# Patient Record
Sex: MALE | Race: WHITE
[De-identification: names, ages, dates, MRNs, and addresses within clinical notes are randomized per-mention and may not be internally consistent; named-entity substitution may affect disease eponyms.]

---

## 2018-05-04 ENCOUNTER — HOSPITAL ENCOUNTER (INPATIENT)
Dept: HOSPITAL 68 - ERH | Age: 83
LOS: 4 days | Discharge: NURSING, REHAB OR LONG-TERM CARE | DRG: 93 | End: 2018-05-08
Attending: INTERNAL MEDICINE | Admitting: INTERNAL MEDICINE
Payer: COMMERCIAL

## 2018-05-04 VITALS — HEIGHT: 69 IN | WEIGHT: 117.44 LBS | BODY MASS INDEX: 17.39 KG/M2

## 2018-05-04 VITALS — SYSTOLIC BLOOD PRESSURE: 96 MMHG | DIASTOLIC BLOOD PRESSURE: 58 MMHG

## 2018-05-04 DIAGNOSIS — M47.896: ICD-10-CM

## 2018-05-04 DIAGNOSIS — Z91.81: ICD-10-CM
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
## 2018-05-04 DIAGNOSIS — Q68.8: ICD-10-CM
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
## 2018-05-04 DIAGNOSIS — D64.9: ICD-10-CM

## 2018-05-04 DIAGNOSIS — G20: ICD-10-CM

## 2018-05-04 DIAGNOSIS — F02.80: ICD-10-CM

## 2018-05-04 DIAGNOSIS — K21.9: ICD-10-CM

## 2018-05-04 DIAGNOSIS — R26.81: Primary | ICD-10-CM

## 2018-05-04 DIAGNOSIS — N40.0: ICD-10-CM

## 2018-05-04 LAB
ABSOLUTE GRANULOCYTE CT: 5.4 /CUMM (ref 1.4–6.5)
APTT BLD: 32 SEC (ref 25–37)
BASOPHILS # BLD: 0 /CUMM (ref 0–0.2)
BASOPHILS NFR BLD: 0.6 % (ref 0–2)
EOSINOPHIL # BLD: 0.1 /CUMM (ref 0–0.7)
EOSINOPHIL NFR BLD: 1.8 % (ref 0–5)
ERYTHROCYTE [DISTWIDTH] IN BLOOD BY AUTOMATED COUNT: 14.6 % (ref 11.5–14.5)
GRANULOCYTES NFR BLD: 66.9 % (ref 42.2–75.2)
HCT VFR BLD CALC: 31.9 % (ref 42–52)
LYMPHOCYTES # BLD: 1.7 /CUMM (ref 1.2–3.4)
MCH RBC QN AUTO: 29.3 PG (ref 27–31)
MCHC RBC AUTO-ENTMCNC: 31.8 G/DL (ref 33–37)
MCV RBC AUTO: 92.1 FL (ref 80–94)
MONOCYTES # BLD: 0.8 /CUMM (ref 0.1–0.6)
PLATELET # BLD: 155 /CUMM (ref 130–400)
PMV BLD AUTO: 9.2 FL (ref 7.4–10.4)
PROTHROMBIN TIME: 12 SEC (ref 9.4–12.5)
RED BLOOD CELL CT: 3.47 /CUMM (ref 4.7–6.1)
WBC # BLD AUTO: 8 /CUMM (ref 4.8–10.8)

## 2018-05-04 PROCEDURE — G0480 DRUG TEST DEF 1-7 CLASSES: HCPCS

## 2018-05-04 NOTE — CT SCAN REPORT
EXAMINATION:
CT LOWER EXTREMITY WITHOUT CONTRAST, left hip
 
CLINICAL INFORMATION:
Unwitnessed fall. Cannot walk. Left hip pain.
 
COMPARISON:
None
 
TECHNIQUE:
Axial images obtained through the left hip. Coronal and sagittal reformatted
images are performed at the CT scanner
 
DLP:
661.90 mGy-cm
 
FINDINGS:
There is no fracture or dislocation of the left hip. No focal bone lesion. No
joint effusion. No soft tissue abnormality around the left hip.
 
Within the pelvis there is a large volume of stool in the visualized large
bowel loops. There are numerous diverticula of left colon and sigmoid without
inflammatory changes.
 
IMPRESSION:
No acute abnormality left hip.

## 2018-05-04 NOTE — CT SCAN REPORT
EXAMINATION:
CT LUMBAR SPINE WITHOUT CONTRAST
 
CLINICAL INFORMATION:
Unwitnessed fall. Unable to ambulate. Evaluate for fracture.
 
COMPARISON:
No relevant prior imaging.
 
TECHNIQUE:
 images were obtained. CT acquisition of the lumbar spine was performed
without intravenous administration contrast. Data was reformatted into
multiplanar images at the acquisition workstation.
 
DLP:
425.65 mGy-cm
 
FINDINGS:
There is spinal scoliosis with a subtle rightward convex curvature. Slight
grade 1 anterolisthesis of L4 on L5 that appears to be related to advanced
facet degenerative changes at this level. Alignment is otherwise normal.
Vertebral heights are preserved. No acute fracture. There is slight loss of
intervertebral disc height with associated disc osteophyte spurring at the
levels of L2-L3, L3-L4, and L4-L5.
 
At T12-L1 and L1-L2 the annular contours are normal. No canal or
neuroforaminal compromise at these 2 levels.
 
At L2-L3 there is a diffusely bulging disc. Bilateral facet degenerative
change. No canal stenosis. No foraminal nerve root compression.
 
At L3-L4 there is a diffusely bulging disc. Bilateral facet degenerative
change. No canal stenosis. Mild to moderate mass effect on the foraminal
segment of left L3 nerve root.
 
At L4-L5 there is a diffuse annular bulge/pseudobulge causing ventral
flattening of the thecal sac. Advanced bilateral facet degenerative change.
No canal stenosis. Mild mass effect on the foraminal segments of both L4
nerve roots.
 
At L5-S1 there is a slightly bulging disc. Bilateral facet degenerative
change. No canal stenosis. Mild mass effect on the foraminal segments of both
L5 nerve roots.
 
Limited visualization of the retroperitoneal structures reveals numerous
diverticula within the sigmoid colon. There is atherosclerotic caliber
indication involve the abdominal aorta and iliac vessels. Psoas and
paraspinal muscle groups are symmetric.
 
IMPRESSION:
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
anterolisthesis of L4 on L5 that appears to be related to advanced facet
degenerative changes at this level. No acute fracture. No evidence of canal
compromise.

## 2018-05-04 NOTE — CT SCAN REPORT
EXAMINATION:
CT HEAD WITHOUT CONTRAST
CT CERVICAL SPINE WITHOUT CONTRAST
 
CLINICAL INFORMATION:
Status post unwitnessed fall. Rule out ICH and fracture.
 
COMPARISON:
None
 
TECHNIQUE:
CT of the head and cervical spine were performed without intravenous
contrast. Multiplanar reformats were rendered and reviewed.
 
DLP:
959 mGy-cm.
 
FINDINGS:
CT head:
There is no intracranial hemorrhage, extra-axial collection, or calvarial
fracture. There is no mass, mass effect, or CT evidence of large territory
infarction. There is mild scattered hypoattenuation in the bilateral cerebral
white matter, which is nonspecific but likely reflects small vessel disease.
There is moderate diffuse brain parenchymal volume loss with prominence of
the ventricles and sulci.
 
The visualized paranasal sinuses are essentially clear. There are layering
secretions within the nasopharynx. The visualized mastoid air cells are
clear.
 
CT cervical spine:
The cervical vertebral body heights are maintained. The craniovertebral
junction is intact. There is trace retrolisthesis of C5 on C6.
 
There are multilevel degenerative changes with intervertebral disc height
loss at C4-C5 and C5-C6 in addition to multilevel endplate osteophytes,
uncovertebral hypertrophy, and facet arthropathy. There is no high-grade
osseous encroachment on the spinal canal. There is multilevel neural
foraminal stenosis which is most advanced at C4-C5 and C5-C6.
 
The lung apices are clear. No mass or fluid collection is seen within the
soft tissues. 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 There are atheromatous
changes at the carotid bifurcations more extensive on the left.
 
IMPRESSION:
CT head:
No acute intracranial abnormality. Mild small vessel ischemic changes and
moderate diffuse brain parenchymal volume loss.
 
CT cervical spine:
No cervical spine fracture or traumatic malalignment. Multilevel degenerative
spondylotic changes without high-grade osseous encroachment on the spinal
canal. Incidentally noted right-sided vocal fold paresis.

## 2018-05-04 NOTE — HISTORY & PHYSICAL
Mohsen NAVARRO,[place] 05/04/18 8479:
General Information and HPI
MD Statement:
I have seen and personally examined VOLODYMYR ZHANG and documented this H&P.
 
The patient is a 83 year old M who presented with a patient stated chief 
complaint of [Unwitnessed fall].
 
Source of Information: ER notes
Exam Limitations: unable to give history, dementia
History of Present Illness:
83-year-old male past medical history of Parkinson's and dementia who presents 
with an unwitnessed fall.  He was found by his aide on the floor and it is 
unclear if fall was mechanical or otherwise, if he had lost consciousness or how
long he will lay on the floor before he was found.  It is not known if patient 
has fallen in the recent past so if he has a history of multiple falls.
 
Patient is unable to contribute to the history due to dementia, and his wife who
is his primary caregiver cannot be reached on the phone as at the time of this 
interview to get more history.
 
Allergies/Medications
Allergies:
Coded Allergies:
No Known Allergies (05/04/18)
 
Home Med list
Divalproex Sodium 250 MG TABLET.DR   1 TAB PO QPM UNKNOWN  (Reported)
Memantine HCl (Namenda XR) 28 MG CAP.SPR.24   1 CAP PO DAILY DEMENTIA  (Reported
)
Omeprazole 40 MG CAPSULE.DR   1 CAP PO DAILY GI  (Reported)
Tamsulosin HCl 0.4 MG CAP.ER.24H   1 CAP PO DAILY PROSTATE  (Reported)
 
Compliance With Home Meds: UNKNOWN
 
Past History
 
Travel History
Traveled to [place] past 21 day No
 
Medical History
Neurological: dementia, Parkinson's disease
 
Surgical History
Surgical History: non-contributory
 
Review of Systems
 
Review of Systems
Constitutional:
Reports: no symptoms. 
 
Exam & Diagnostic Data
Last 24 Hrs of Vital Signs/I&O
 Vital Signs
 
 
Date Time Temp Pulse Resp B/P B/P Pulse O2 O2 Flow FiO2
 
     Mean Ox Delivery Rate 
 
05/04 2354 98.4 57 18 96/58  94 Room Air  
 
05/04 2307 98.0 54 16 100/56  98 Room Air  
 
05/04 2133 97.7 56 16 102/53  99 Room Air  
 
05/04 1915 97.5 91 18 100/54  98 Room Air  
 
05/04 1617 96.7 84 18 101/68  99 Room Air  
 
05/04 1408  70 18 125/69  98 Room Air  
 
05/04 1142 97.8 70 18 139/92  97 Room Air  
 
 
 Intake & Output
 
 
 05/05 0800 05/05 0000 05/04 1600
 
Intake Total   0
 
Output Total   300
 
Balance   -300
 
    
 
Intake, Oral   0
 
Output, Urine   300
 
Patient   160 lb
 
Weight   
 
Weight   Reported by Patient
 
Measurement   
 
Method   
 
 
 
 
Physical Exam
General Appearance Alert, No Acute Distress, oriented to person only, tremors, 
Oriented only to self, became agitated during exam
Skin No Significant Lesion
HEENT dry mucous membranes
Cardiovascular Regular Rate, Normal S1, Normal S2
Lungs Clear to Auscultation (anteriorly), Normal Air Movement
Abdomen Soft, No Tenderness
Extremities No Edema, Normal Pulses, No Tenderness/Swelling
Last 24 Hrs of Labs/Galo:
 Laboratory Tests
 
05/04/18 1405:
Urine Opiates Screen < 100, Methadone Screen < 40, Barbiturate Screen < 60, Ur 
Phencyclidine Scrn < 6.00, Amphetamines Screen < 100, U Benzodiazepines Scrn < 
85, Urine Cocaine Screen < 50, Urine Cannabis Screen < 5.00, Urine Color STRAW, 
Urine Clarity CLEAR, Urine pH 7.5, Ur Specific Gravity 1.015, Urine Protein NEG,
Urine Ketones NEG, Urine Nitrite NEG, Urine Bilirubin NEG, Urine Urobilinogen 
0.2, Ur Leukocyte Esterase NEG, Ur Microscopic EXAM NOT REQUIRED, Urine 
Hemoglobin NEG, Urine Glucose NEG
 
05/04/18 1300:
Anion Gap 6, Estimated GFR > 60, BUN/Creatinine Ratio 24.4, Glucose 86, Calcium 
9.0, Total Bilirubin 0.5, AST 21, ALT 18  L, Alkaline Phosphatase 42, Creatine 
Kinase 79, Troponin I < 0.01, Total Protein 5.9  L, Albumin 3.4  L, Globulin 2.5
, Albumin/Globulin Ratio 1.4, PT 12.0, INR 1.10, APTT 32, CBC w Diff NO MAN DIFF
REQ, RBC 3.47  L, MCV 92.1, MCH 29.3, MCHC 31.8  L, RDW 14.6  H, MPV 9.2, Gran %
66.9, Lymphocytes % 21.0, Monocytes % 9.7  H, Eosinophils % 1.8, Basophils % 0.6
, Absolute Granulocytes 5.4, Absolute Lymphocytes 1.7, Absolute Monocytes 0.8  H
, Absolute Eosinophils 0.1, Absolute Basophils 0, Serum Alcohol < 10.0
 
 
Assessment/Plan
Assessment:
83-year-old male past medical history of Parkinson's and dementia who presents 
with an unwitnessed fall.  Imaging does not show any acute fractures.
 
Assessment
1.  Unwitnessed fall-not known if mechanical or orthostatic given patient's 
history of Parkinson's disease or due to dehydration
2.  Gait instability/deconditioning
3.  Mild Anemia
 
 
Plan
-Admit to general medicine
-Check Orthostats
-IV fluid normal saline for hydration
-Check a Depakote level
-Physical therapy evaluation
-Repeat labs in a.m.
-Patient will need short-term rehab
-Tylenol/Ibuprofen as needed for pain
-Give low dose IM haldol if very agitated. avoid benzo's
-Avoid delirium triggers like noise, dehydration, constipation, pain etc
-Keep hydrated, adequate pain control, frequent re-orientation
-Avoid opiates due to increased delirium risk
-Bowel regimen 
-Keep FC for now
-SC lovenox
-Resume his impt home medications including memantine, depakote, 
 
*I called but was unable to get the wife tonight on the phone. Please call wife 
in a.m. to obtain more history about his gait and mobility status, CODE STATUS, 
diet type/swallowing issues, past medical history and confirm medication list-pt
is not on any meds for Parkinson's on his reconcile med list so please clarify 
if he does have Parkinson's dx or Lewy Body dementia.
 
As Ranked By This Provider
Problem List:
 1. Gait instability
 
 2. Fall
   Qualifiers
 Encounter type: initial encounter Qualified Code: W19.XXXA - Unspecified fall, 
initial encounter
 
 
Core Measures/Misc (9/17)
 
Acute Coronary Syndrome
ACS Diagnosis: No
 
Congestive Heart Failure
Congestive Heart Failure Diagnosis No
 
Cerebrovascular Accident
CVA/TIA Diagnosis: No
 
VTE (View Protocol)
VTE Risk Factors Age>40
No Mechanical VTE Prophylaxis d/t N/A MechProphylax Ordered
No VTE Pharm Prophylaxis d/t NA PharmProphylax ordered
 
Sepsis (View protocol)
Sepsis Present: No
 
Resident Review Statement
Resident Statement: examined this patient
Other Findings:
see HPI
 
 
Georgia Don 05/05/18 0159:
Attending MD Review Statement
 
Attending Statement
Attending MD Statement: examined this patient, discuss w/resident/PA/NP, agreed 
w/resident/PA/NP, reviewed EMR data (avail), reviewed images, amended to note
Attending Assessment/Plan:
 
CC: Fall
PMH: Dementia
 
History is limited, patient has significant dementia appears angry and mildly 
agitated, does not talk much except "no". Family not available bedside. We Tried
calling patient's wife, she did not answer the phone. According to ER records 
patient was found on the floor in between pouch and recliner by his health aide.
As per EMS patient's wife is primary caregivers and she was not at home. Patient
's fall was unwitnessed. 
 
Vitals: Temperature 97.8, pulse 70, RR 18, blood pressure 139/92, saturating 97%
on room air.
On exam: Thin built, appears angry, does not answer question. Tremors present, 
rigidity present, does not follow instructions : neuro exam difficult to assess.
Mucosa moist. NO skin lesions, no pedal edema. Abdo; Soft, NT, NT. CVS : s1, s2,
RRR. RS: clear bases but limited exam. 
 
CT cervical spine, CT head, chest x-ray, hip x-ray, pelvis x-ray, left hip CT 
scan, lumbar CT scan : Reviewed. No acute changes
 
Assessment and plan 
 
83-year-old male with history of dementia was brought in ER to EMS after found 
on the floor by health aide. Patient may have unwitnessed fall, no history is 
available regarding loss of consciousness or seizure-like episode or any other 
trauma. Wife could not be contacted to get detailed information. History and 
physical examination is limited because of his significant dementia and 
agitation. Multiple images were obtain in ER, which were unremarkable for any 
acute trauma. Patient was unable to ambulate even with assistance, may need 
rehabilitation placement. Need to get more detailed information from patient's 
wife. Questionable history of parkinsonism
 
+ Unwitnessed fall
+ Physical deconditioning and unsteady gait
+ History of dementia
 
- Admit to general medicine
- Continue gentle hydration
- try orthostatic vitals in a.m. : Currently patient is agitated
- PT evaluation
- Check valproate level
- DVT prophylaxis
- Assess for rehabilitation placement

## 2018-05-04 NOTE — RADIOLOGY REPORT
EXAMINATION:\H\
\N\XR CHEST
 
CLINICAL INFORMATION:
Fall.
 
COMPARISON:
None
 
TECHNIQUE:
Frontal view of the chest was obtained.
 
FINDINGS:
 
 
The lungs are well expanded. There is no focal consolidation, edema, or
effusion. No pneumothorax. The cardiomediastinal silhouette is within normal
limits of size with a calcified aorta. No acute osseous abnormality.
 
IMPRESSION:
No acute pulmonary findings. No displaced fractures are seen.

## 2018-05-04 NOTE — RADIOLOGY REPORT
EXAMINATION:
XR PELVIS
XR HIP, LEFT
 
CLINICAL INFORMATION:
Unwitnessed fall.
 
COMPARISON:
None
 
TECHNIQUE:
Frontal view of the pelvis with 2 additional views of the left hip.
 
FINDINGS:
There is no fracture or dislocation. The femoral heads are well-seated within
their acetabula. Mild degenerative changes are seen at the hips with small
marginal osteophytes present. The pelvic rim is intact. The sacroiliac joints
and pubic symphysis are intact. The bowel gas pattern is unremarkable.
Degenerative changes of the lower lumbar spine.
 
IMPRESSION:
No acute fracture or malalignment. Mild degenerative changes of the hips.

## 2018-05-04 NOTE — ED MVC/FALL/TRAUMA COMPLAINT
History of Present Illness
 
General
Chief Complaint: Fall
Stated Complaint: FALL
Source: patient, EMS, health aide
Exam Limitations: dementia, poor historian
Allergies
Coded Allergies:
No Known Allergies (18)
 
Reconcile Medications
Divalproex Sodium 250 MG TABLET.DR   1 TAB PO QPM UNKNOWN  (Reported)
Memantine HCl (Namenda XR) 28 MG CAP.SPR.24   1 CAP PO DAILY DEMENTIA  (Reported
)
Omeprazole 40 MG CAPSULE.DR   1 CAP PO DAILY GI  (Reported)
Tamsulosin HCl 0.4 MG CAP.ER.24H   1 CAP PO DAILY PROSTATE  (Reported)
 
Triage Note:
BIBA FROM HOME, WAS FOUND BY AID ON FLOOR IN
BETWEEN COUCH AND RECLINER. (UNWITNESSED).  PER
EMS, PT'S WIFE IS PRIMARY CARETAKER, BUT HAD LEFT
HOUSE.  MOANING WITH SPASTIC ARM AND LEG
MEOVEMNTS.  PMH: PARKINSONS.  UNABLE TO ANSWER
QUESTIONS.  TOWEL TAPED AROUND NECK FOR SPINAL
IMMOBILIZATION.
Triage Nurses Notes Reviewed? yes
Onset: Gradual
Duration: hour(s):
Timing: single episode today
Severity: moderate
Injuries/Fall Location: head, neck
Method of Injury: fall
Loss of Consciousness: unsure
HPI:
82yo male with hx of Parkinson's BIBA to ED after unwitnessed fall at home 
today.  Per patient's aide, when she arrived she found the patient on the floor 
between a side table and his recliner chair.  Patient had an unwitnessed fall, 
unknown loss of consciousness and duration.  She is wife was at work when he 
fell.  HPI is limited due to patient's Parkinson's/dementia however he reports 
no pain at this time.  Patient is unsure of why he fell, does not remember the 
fall. 
(Darcy FRANCIS,Elizabeth Ni)
 
Vital Signs & Intake/Output
Vital Signs & Intake/Output
 Vital Signs
 
 
Date Time Temp Pulse Resp B/P B/P Pulse O2 O2 Flow FiO2
 
     Mean Ox Delivery Rate 
 
 2133 97.7 56 16 102/53  99 Room Air  
 
 1915 97.5 91 18 100/54  98 Room Air  
 
 1617 96.7 84 18 101/68  99 Room Air  
 
 1408  70 18 125/69  98 Room Air  
 
 1142 97.8 70 18 139/92  97 Room Air  
 
 
 
(Kaila NAVARRO,Zackery CABA)
 
Past History
 
Travel History
Traveled to [place] past 21 day No
 
Medical History
Any Pertinent Medical History? see below for history
Neurological: Parkinson's disease
 
Surgical History
Surgical History: non-contributory
 
Psychosocial History
What is your primary language English
Tobacco Use: Cognitive Impairment
ETOH Use: 6
 
Family History
Hx Contributory? No
(Darcy FRANCIS,Elizabeth Ni)
 
Review of Systems
 
Review of Systems
Constitutional:
Reports: no symptoms. 
Eyes:
Reports: no symptoms. 
Ears, Nose, Throat, Mouth:
Reports: no symptoms. 
Respiratory:
Reports: no symptoms. 
Cardiovascular:
Reports: no symptoms. 
Gastrointestinal/Abdominal:
Reports: no symptoms. 
Genitourinary:
Reports: no symptoms. 
Musculoskeletal:
Reports: see HPI. 
Skin:
Reports: no symptoms. 
Neurological/Psychological:
Reports: see HPI. 
All Other Systems: Reviewed and Negative
(Darcy FRANCIS,Elizabeth Ni)
 
Physical Exam
 
Physical Exam
General Appearance: well developed/nourished, no apparent distress, alert, awake
Head: atraumatic, normal appearance
Eyes:
Bilateral: normal appearance, PERRL, EOMI. 
Ears, Nose, Throat, Mouth: hearing grossly normal, moist mucous membrane
Neck: normal inspection, posterior cervical spine tenderness
Respiratory: normal breath sounds, no respiratory distress, lungs clear
Cardiovascular: regular rate/rhythm
Gastrointestinal: normal bowel sounds, soft, non-tender, no organomegaly
Extremities: ROM of upper extremities WNL ROM of lower extremities is limited 
with rigidity present, tenderness to left hip
Neurologic/Psych: awake, alert, CNs II-XII nml as tested
Skin: intact, normal color, warm/dry
 
Core Measures
ACS in differential dx? Yes
CVA/TIA Diagnosis No
Sepsis Present: No
Sepsis Focused Exam Completed? No
(Darcy FRANCIS,Elizabeth Ni)
 
Progress
Differential Diagnosis: abd injury, C/T/L spine injury, ext injury, ICH, pelvis 
injury, pnemothorax, spinal cord injury
Diagnostic Imaging:
Viewed by Me: Radiology Read, CT Scan.  Discussed w/RAD: Radiology Read, CT 
Scan. 
Radiology Impression: PATIENT: VOLODYMYR ZHANG  MEDICAL RECORD NO: [id number] 
PRESENT AGE: 83  PATIENT ACCOUNT NO: [id number] : 36  LOCATION: [facility] 
ORDERING PHYSICIAN: Elizabeth FRANCIS     SERVICE DATE:  EXAM 
TYPE: RAD - XRY-CHEST XRAY, SINGLE VIEW EXAMINATION:\H\ \N\XR CHEST CLINICAL 
INFORMATION: Fall. COMPARISON: None TECHNIQUE: Frontal view of the chest was 
obtained. FINDINGS: The lungs are well expanded. There is no focal consolidation
, edema, or effusion. No pneumothorax. The cardiomediastinal silhouette is 
within normal limits of size with a calcified aorta. No acute osseous 
abnormality. IMPRESSION: No acute pulmonary findings. No displaced fractures are
seen. DICTATED BY: Hugh Moreira MD  DATE/TIME DICTATED:18 
:ERNESTO  DATE/TIME TRANSCRIBED:18 CONFIDENTIAL, 
DO NOT COPY WITHOUT APPROPRIATE AUTHORIZATION.  <Electronically signed in Other 
Vendor System>                                                                  
                     SIGNED BY: Hugh Moreira , PATIENT: 
VOLODYMYR ZHANG  MEDICAL RECORD NO: [id number] PRESENT AGE: 83  PATIENT ACCOUNT NO
: [id number] : 36  LOCATION: [facility] ORDERING PHYSICIAN: Elizabeth FRANCIS 
   SERVICE DATE: 1205 EXAM TYPE: RAD - XRY-AP PELVIS; XRY-HIP 2-3 VIEWS
, LEFT EXAMINATION: XR PELVIS XR HIP, LEFT CLINICAL INFORMATION: Unwitnessed 
fall. COMPARISON: None TECHNIQUE: Frontal view of the pelvis with 2 additional 
views of the left hip. FINDINGS: There is no fracture or dislocation. The 
femoral heads are well-seated within their acetabula. Mild degenerative changes 
are seen at the hips with small marginal osteophytes present. The pelvic rim is 
intact. The sacroiliac joints and pubic symphysis are intact. The bowel gas 
pattern is unremarkable. Degenerative changes of the lower lumbar spine. 
IMPRESSION: No acute fracture or malalignment. Mild degenerative changes of the 
hips. DICTATED BY: Hugh Moreira MD  DATE/TIME DICTATED:18 
:ERNESTO  DATE/TIME TRANSCRIBED:18 CONFIDENTIAL, 
DO NOT COPY WITHOUT APPROPRIATE AUTHORIZATION.  <Electronically signed in Other 
Vendor System>                                                                  
                     SIGNED BY: Hugh Moreira MD 18, PATIENT: 
VOLODYMYR ZHANG  MEDICAL RECORD NO: [id number] PRESENT AGE: 83  PATIENT ACCOUNT NO
: [id number] : 36  LOCATION: [facility] ORDERING PHYSICIAN: Elizabeth FRANCIS 
   SERVICE DATE:  EXAM TYPE: CAT - CT CERV SPINE WO IV CONTRAST; CT
HEAD WO IV CONTRAST EXAMINATION: CT HEAD WITHOUT CONTRAST CT CERVICAL SPINE 
WITHOUT CONTRAST CLINICAL INFORMATION: Status post unwitnessed fall. Rule out 
ICH and fracture. COMPARISON: None TECHNIQUE: CT of the head and cervical spine 
were performed without intravenous contrast. Multiplanar reformats were rendered
and reviewed. DLP: 959 mGy-cm. FINDINGS: CT head: There is no intracranial 
hemorrhage, extra-axial collection, or calvarial fracture. There is no mass, 
mass effect, or CT evidence of large territory infarction. There is mild 
scattered hypoattenuation in the bilateral cerebral white matter, which is 
nonspecific but likely reflects small vessel disease. There is moderate diffuse 
brain parenchymal volume loss with prominence of the ventricles and sulci. The 
visualized paranasal sinuses are essentially clear. There are layering 
secretions within the nasopharynx. The visualized mastoid air cells are clear. 
CT cervical spine: The cervical vertebral body heights are maintained. The 
craniovertebral junction is intact. There is trace retrolisthesis of C5 on C6. 
There are multilevel degenerative changes with intervertebral disc height loss 
at C4-C5 and C5-C6 in addition to multilevel endplate osteophytes, uncovertebral
hypertrophy, and facet arthropathy. There is no high-grade osseous encroachment 
on the spinal canal. There is multilevel neural foraminal stenosis which is most
advanced at C4-C5 and C5-C6. The lung apices are clear. No mass or fluid 
collection is seen within the soft tissues. 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 
paresis. There are atheromatous changes at the carotid bifurcations more 
extensive on the left. IMPRESSION: CT head: No acute intracranial abnormality. 
Mild small vessel ischemic changes and moderate diffuse brain parenchymal volume
loss. CT cervical spine: No cervical spine fracture or traumatic malalignment. 
Multilevel degenerative spondylotic changes without high-grade osseous 
encroachment on the spinal canal. Incidentally noted right-sided vocal fold 
paresis. DICTATED BY: Fortunato Mooney MD  DATE/TIME DICTATED:18 
:ERNESTO  DATE/TIME TRANSCRIBED:18 CONFIDENTIAL, 
DO NOT COPY WITHOUT APPROPRIATE AUTHORIZATION.  <Electronically signed in Other 
Vendor System>                                                                  
                     SIGNED BY: Fortunato Mooney MD 18 1529, PATIENT: 
VOLODYMYR ZHANG  MEDICAL RECORD NO: [id number] PRESENT AGE: 83  PATIENT ACCOUNT NO
: [id number] : 36  LOCATION: [facility] ORDERING PHYSICIAN: Elizabeth FRANCIS 
   SERVICE DATE:  EXAM TYPE: CAT - CT LUMB SPINE WO IV CONTRAST 
EXAMINATION: CT LUMBAR SPINE WITHOUT CONTRAST CLINICAL INFORMATION: Unwitnessed 
fall. Unable to ambulate. Evaluate for fracture. COMPARISON: No relevant prior 
imaging. TECHNIQUE:  images were obtained. CT acquisition of the lumbar 
spine was performed without intravenous administration contrast. Data was 
reformatted into multiplanar images at the acquisition workstation. DLP: 425.65 
mGy-cm FINDINGS: There is spinal scoliosis with a subtle rightward convex 
curvature. Slight grade 1 anterolisthesis of L4 on L5 that appears to be related
to advanced facet degenerative changes at this level. Alignment is otherwise 
normal. Vertebral heights are preserved. No acute fracture. There is slight loss
of intervertebral disc height with associated disc osteophyte spurring at the 
levels of L2-L3, L3-L4, and L4-L5. At T12-L1 and L1-L2 the annular contours are 
normal. No canal or neuroforaminal compromise at these 2 levels. At L2-L3 there 
is a diffusely bulging disc. Bilateral facet degenerative change. No canal 
stenosis. No foraminal nerve root compression. At L3-L4 there is a diffusely 
bulging disc. Bilateral facet degenerative change. No canal stenosis. Mild to 
moderate mass effect on the foraminal segment of left L3 nerve root. At L4-L5 
there is a diffuse annular bulge/pseudobulge causing ventral flattening of the 
thecal sac. Advanced bilateral facet degenerative change. No canal stenosis. 
Mild mass effect on the foraminal segments of both L4 nerve roots. At L5-S1 
there is a slightly bulging disc. Bilateral facet degenerative change. No canal 
stenosis. Mild mass effect on the foraminal segments of both L5 nerve roots. 
Limited visualization of the retroperitoneal structures reveals numerous 
diverticula within the sigmoid colon. There is atherosclerotic caliber 
indication involve the abdominal aorta and iliac vessels. Psoas and paraspinal 
muscle groups are symmetric. IMPRESSION: There is multilevel degenerative 
spondylosis of the lumbar spine with grade 1 anterolisthesis of L4 on L5 that 
appears to be related to advanced facet degenerative changes at this level. No 
acute fracture. No evidence of canal compromise. DICTATED BY: Osiel Akhtar MD  DATE/TIME DICTATED:18 :ERNESTO  DATE/TIME 
TRANSCRIBED:18 CONFIDENTIAL, DO NOT COPY WITHOUT APPROPRIATE 
AUTHORIZATION.  <Electronically signed in Other Vendor System>                  
                                                                     SIGNED BY: 
Osiel Akhtar MD 18 [id number]
Initial ED EKG: sinus rhythm @66bpm, first degree AV block
Prior EKG: unchanged (02)
(Darcy FRANCIS,Elizabeth Ni)
Plan of Care:
 Orders
 
 
Procedure Date/time Status
 
Nothing by Mouth  B Active
 
Saline Lock 2111 Active
 
Misc Message 2111 Active
 
ED Holding Orders 2111 Active
 
Admit to inpatient 2111 Active
 
Add-on Test (ER Only) 2111 Active
 
Vital Signs 2111 Active
 
Code Status 2111 Active
 
URINE DRUG SCREEN FOR ER ONLY  1153 Complete
 
URINALYSIS  1153 Complete
 
TROPONIN LEVEL  1153 Complete
 
PARTIAL THROMBOPLASTIN TIME  1153 Complete
 
PROTHROMBIN TIME  1153 Complete
 
ETHANOL  1153 Complete
 
COMPREHENSIVE METABOLIC PANEL  1153 Complete
 
CREATINE PHOSPHOKINASE  1153 Complete
 
CBC WITHOUT DIFFERENTIAL  1153 Complete
 
EKG  1153 Active
 
 
 Laboratory Tests
 
 
 
18 1405:
Urine Opiates Screen < 100, Methadone Screen < 40, Barbiturate Screen < 60, Ur 
Phencyclidine Scrn < 6.00, Amphetamines Screen < 100, U Benzodiazepines Scrn < 
85, Urine Cocaine Screen < 50, Urine Cannabis Screen < 5.00, Urine Color STRAW, 
Urine Clarity CLEAR, Urine pH 7.5, Ur Specific Gravity 1.015, Urine Protein NEG,
Urine Ketones NEG, Urine Nitrite NEG, Urine Bilirubin NEG, Urine Urobilinogen 
0.2, Ur Leukocyte Esterase NEG, Ur Microscopic EXAM NOT REQUIRED, Urine 
Hemoglobin NEG, Urine Glucose NEG
 
18 1300:
Anion Gap 6, Estimated GFR > 60, BUN/Creatinine Ratio 24.4, Glucose 86, Calcium 
9.0, Total Bilirubin 0.5, AST 21, ALT 18  L, Alkaline Phosphatase 42, Creatine 
Kinase 79, Troponin I < 0.01, Total Protein 5.9  L, Albumin 3.4  L, Globulin 2.5
, Albumin/Globulin Ratio 1.4, PT 12.0, INR 1.10, APTT 32, CBC w Diff NO MAN DIFF
REQ, RBC 3.47  L, MCV 92.1, MCH 29.3, MCHC 31.8  L, RDW 14.6  H, MPV 9.2, Gran %
66.9, Lymphocytes % 21.0, Monocytes % 9.7  H, Eosinophils % 1.8, Basophils % 0.6
, Absolute Granulocytes 5.4, Absolute Lymphocytes 1.7, Absolute Monocytes 0.8  H
, Absolute Eosinophils 0.1, Absolute Basophils 0, Serum Alcohol < 10.0
 
X-rays of chest, pelvis, left hip are within normal limits.  Patient's EKG is 
stable, no acute changes.  Labs are within normal limits.  No UTI.  CT head and 
neck is stable, no acute fractures or intracranial abnormality.
 
Has attempted to have patient ambulate here in the emergency department however 
he is complaining of significant pain, requires assistance with 2 people and 
walker, he cannot get in and out of bed by himself.  Patient reports significant
pain to left hip.  Given these findings will obtain CT imaging to further rule 
out hip/pelvis/lumbar spine fracture.  Dr. Bright present to see and evaluate the 
patient.
 
CT scan shows no acute fracture of hip/pelvis/spine.  Patient was medicated with
pain medication.  Ambulation was attempted however patient unable to walk more 
than 5 steps and requires assistance and walker to ambulate, could not get into 
bed required 2 people to help him get into bed.  This is diminished from his 
baseline.  Patient would benefit from short-term rehabilitation, premature 
discharge may result in further falls and injury.  Case management recommend 
full Admission.  Patient's wife agrees with this plan.
 
Spoke with hospitalist Dr. Don regarding this patient's general medicine 
admission.
(Darcy FRANCIS,Elizabeth Ni)
(Kaila NAVARRO,Zackery CABA)
 
Departure
 
Departure
Disposition: STILL A PATIENT
Condition: Stable
Clinical Impression
Primary Impression: Fall
Qualifiers:  Encounter type: initial encounter Qualified Code: W19.XXXA - 
Unspecified fall, initial encounter
Secondary Impressions: Gait instability
Referrals:
Madhuri NAVARRO,Naveen GIVENS
 
Departure Forms:
Customer Survey
General Discharge Information
 
Admission Note
Spoke With:
Georgia Don MD
Documentation of Exam:
Documentation of any treatments & extenuating circumstances including Concerns 
Regarding Discharge (functional status, medication knowledge or non-compliance, 
living conditions, etc.) that warrant an admission rather than observation: [
Unwitnessed fall at home with persistent pain in gait instability requiring 
short-term rehabilitation, case management consult, physical therapy with pain 
management, premature discharge medically unsafe]
 
(Darcy FRANCIS,Elizabeth Ni)
 
PA/NP Co-Sign Statement
Statement:
ED Attending supervision documentation-
 
[X] I saw and evaluated the patient. I have also reviewed all the pertinent lab 
results and diagnostic results. I agree with the findings and the plan of care 
as documented in the PA's/NP's documentation.  Patient presents for evaluation 
of a fall from his recliner.  Patient was found at the side of the recliner on 
the floor.  History and physical examination are limited due to Parkinson 
disease.  Physical examination reveals an alert and interactive patient moves 
all extremities.  It is unclear if the patient has tenderness of the extremities
chest or abdomen given the Parkinson disease and his compromised ability to 
communicate.
 
[] I have reviewed the ED Record and agree with the PA's/NP's documentation.
 
[] Additions or exceptions (if any) to the PAs/NP's note and plan are 
summarized below:
[]
 
(Kaila NAVARRO,Zackery CABA)
 
PA/NP Co-Sign Statement
Statement:
 
ED Attending supervision documentation-
 
[X] I saw and evaluated the patient. I have also reviewed all the pertinent lab 
results and diagnostic results. I agree with the findings and the plan of care 
as documented in the PA's/NP's documentation. 
 
18, 21;12.... PT with parkinson's disease, from home, with no focal 
tenderness on exam, difficulty to ambulate/care for self, merits admission for 
further care. 
 
[] I have reviewed the ED Record and agree with the PA's/NP's documentation.
 
[] Additions or exceptions (if any) to the PAs/NP's note and plan are 
summarized below:
[]
 
(Justino NAVARRO,Trey MILLER)

## 2018-05-05 VITALS — SYSTOLIC BLOOD PRESSURE: 100 MMHG | DIASTOLIC BLOOD PRESSURE: 60 MMHG

## 2018-05-05 VITALS — DIASTOLIC BLOOD PRESSURE: 60 MMHG | SYSTOLIC BLOOD PRESSURE: 112 MMHG

## 2018-05-05 VITALS — DIASTOLIC BLOOD PRESSURE: 60 MMHG | SYSTOLIC BLOOD PRESSURE: 110 MMHG

## 2018-05-05 LAB
ABSOLUTE GRANULOCYTE CT: 3.7 /CUMM (ref 1.4–6.5)
BASOPHILS # BLD: 0 /CUMM (ref 0–0.2)
BASOPHILS NFR BLD: 0.7 % (ref 0–2)
EOSINOPHIL # BLD: 0.1 /CUMM (ref 0–0.7)
EOSINOPHIL NFR BLD: 2 % (ref 0–5)
ERYTHROCYTE [DISTWIDTH] IN BLOOD BY AUTOMATED COUNT: 14.4 % (ref 11.5–14.5)
GRANULOCYTES NFR BLD: 65 % (ref 42.2–75.2)
HCT VFR BLD CALC: 28.4 % (ref 42–52)
LYMPHOCYTES # BLD: 1.3 /CUMM (ref 1.2–3.4)
MCH RBC QN AUTO: 30 PG (ref 27–31)
MCHC RBC AUTO-ENTMCNC: 32.7 G/DL (ref 33–37)
MCV RBC AUTO: 91.5 FL (ref 80–94)
MONOCYTES # BLD: 0.5 /CUMM (ref 0.1–0.6)
PLATELET # BLD: 136 /CUMM (ref 130–400)
PMV BLD AUTO: 10.1 FL (ref 7.4–10.4)
RED BLOOD CELL CT: 3.1 /CUMM (ref 4.7–6.1)
WBC # BLD AUTO: 5.6 /CUMM (ref 4.8–10.8)

## 2018-05-05 NOTE — PN- ATT ADDEND
Attending Addendum
Attending Brief Note
Patient seen and examined.  I am really not able to get any history at all from 
him.  He answers yes and no.  Tells me he can't walk but other than that will 
not answer most of my questions. 
 On exam his pressure is 112/60,  pulse is 63, afebrile and satting at 93%.  He 
is awake and alert, lungs have decreased breath sounds at the bases, heart is S1
-S2 regular, abdomen is soft nontender and he has no edema.  He does appear to 
have some parkinsonian like features with some mild rigidity but my neuro exam 
is limited due to lack of cooperation.
 
He is an 83-year-old male with a history of parkinsonism and dementia as per the
records on valproic acid, memantine and tamsulosin as an outpatient who is here 
status post a fall, weakness and inability to ambulate.  The fall was 
unwitnessed ,  however his EKG doesn't show any acute ST-T changes and and his 
enzymes were negative so the thought was more that this was mechanical in 
nature.  
We are hydrating him gently to account for any orthostatic hypotension 
associated with parkinsonin's, will get a formal PT eval.  Will need more 
collateral from his wife when she gets in.  And will follow closely.  
All of his imaging from the ED has been negative.  He does have degenerative 
lumbar spondylosis but that appears more chronic in nature.

## 2018-05-05 NOTE — ADMISSION CERTIFICATION
Admission Certification
 
Certification Statement
- As attending physician, I certify that at the time of
- admission, based on clinical presentation, severity of
- symptoms, need for further diagnostic testing and
- therapeutic interventions, and risk of adverse outcomes
- without in-hospital treatment, in my clinical assessment,
- this patient requires an acute hospital stay for a minimum
- of two nights or longer. I have also considered psychsocial
- factors such as support system, advanced age, financial
- issues, cognitive issues, and failed out-patient treatments,
- past re-admission history, safety of patient, and lack of
- compliance as applicable.
Specific rationale supporting this admission is:
Unwitnessed fall

## 2018-05-06 VITALS — SYSTOLIC BLOOD PRESSURE: 100 MMHG | DIASTOLIC BLOOD PRESSURE: 60 MMHG

## 2018-05-06 VITALS — DIASTOLIC BLOOD PRESSURE: 70 MMHG | SYSTOLIC BLOOD PRESSURE: 140 MMHG

## 2018-05-06 VITALS — DIASTOLIC BLOOD PRESSURE: 66 MMHG | SYSTOLIC BLOOD PRESSURE: 144 MMHG

## 2018-05-06 LAB
ABSOLUTE GRANULOCYTE CT: 3.7 /CUMM (ref 1.4–6.5)
BASOPHILS # BLD: 0 /CUMM (ref 0–0.2)
BASOPHILS NFR BLD: 0.8 % (ref 0–2)
EOSINOPHIL # BLD: 0.1 /CUMM (ref 0–0.7)
EOSINOPHIL NFR BLD: 1.8 % (ref 0–5)
ERYTHROCYTE [DISTWIDTH] IN BLOOD BY AUTOMATED COUNT: 13.7 % (ref 11.5–14.5)
GRANULOCYTES NFR BLD: 66.9 % (ref 42.2–75.2)
HCT VFR BLD CALC: 31.1 % (ref 42–52)
LYMPHOCYTES # BLD: 1.2 /CUMM (ref 1.2–3.4)
MCH RBC QN AUTO: 30 PG (ref 27–31)
MCHC RBC AUTO-ENTMCNC: 33.1 G/DL (ref 33–37)
MCV RBC AUTO: 90.9 FL (ref 80–94)
MONOCYTES # BLD: 0.5 /CUMM (ref 0.1–0.6)
PLATELET # BLD: 145 /CUMM (ref 130–400)
PMV BLD AUTO: 9.9 FL (ref 7.4–10.4)
RED BLOOD CELL CT: 3.42 /CUMM (ref 4.7–6.1)
WBC # BLD AUTO: 5.6 /CUMM (ref 4.8–10.8)

## 2018-05-06 NOTE — PN- HOUSESTAFF
[facility]
Subjective
Follow-up For:
Mechanical fall
Altered mental status
Subjective:
No overnight events.  Patient remained afebrile. Seen and  examined this 
morning.  He denied any chest pain, short of breath, nausea, vomiting, chills, 
fever, abdominal pain dysuria.  Patient is alert and oriented this morning
 
Review of Systems
Constitutional:
Denies: chills, fever. 
EENTM:
Reports: no symptoms. 
Cardiovascular:
Denies: chest pain, orthopena, palpitations. 
Respiratory:
Denies: cough, short of breath, sputum production. 
Gastrointestinal:
Denies: abdominal pain, diarrhea, melena, nausea. 
Genitourinary:
Reports: no symptoms. 
Neurological/Psychological:
Reports: no symptoms. 
 
Objective
Last 24 Hrs of Vital Signs/I&O
 Vital Signs
 
 
Date Time Temp Pulse Resp B/P B/P Pulse O2 O2 Flow FiO2
 
     Mean Ox Delivery Rate 
 
 0857  60  142/78     
 
/ 0620 98.1 67 18 144/66  98 Room Air  
 
 2306 97.5 62 20 100/60  97 Room Air  
 
 1427 98.0 60 20 110/60  99   
 
 
 Intake & Output
 
 
 / 1600 05/06 0800 05/06 0000
 
Intake Total  700 400
 
Output Total   
 
Balance  700 400
 
    
 
Intake, IV  600 300
 
Intake, Oral  100 100
 
Number  0 
 
Bowel   
 
Movements   
 
Patient  116 lb 
 
Weight   
 
Weight  Bed scale 
 
Measurement   
 
Method   
 
 
 
 
Physical Exam
General Appearance: Alert, Oriented X3, Cooperative
Skin: No Rashes
Skin Temp/Moisture Exam: Warm/Dry
Sepsis Skin Exam (color): Normal for Ethnicity
HEENT: Atraumatic, PERRLA, EOMI
Neck: Supple
Cardiovascular: Normal S1, Normal S2
Lungs: Clear to Auscultation
Abdomen: Soft, No Tenderness
Neurological: Difficulty in articulation
Extremities: No Edema
 
Assessment/Plan
Assessment:
83-year-old male past medical history of Parkinson's and dementia who presents 
with an unwitnessed fall. 
 
We are following the patient for following problems:
 
Altered mental status and unwitnessed mechanical fall:
-Imaging studies are negative for any fracture.
-According to wife patient has difficulty in doing home physical therapy for 
last couple of days.  Considering patient's history of Parkinson disease wife 
reported having increase pain/stiffness in the legs.
-Wife also reported that patient has difficulty in articulation for long time 
that's his baseline.
-According to his wife possibly patient has mechanical fall and he was alert and
he has baseline dementia.
-Yesterday patient was sleepy possibly he received Roxicodone in ED that's why 
he was drowsy.  He was arousable.
-Patient needs PT evaluation for gait instability
-Possible short-term rehabilitation.
-Patient is following neurology as outpatient for Parkinson disease and his wife
reported that his Parkinson disease is worsening according to his neurologist.  
Patient had recent CAT scan of the head for his Parkinson disease.
-Continue pain medications according to pain [facility].
-Considering patient's altered mental status he is receiving IV fluids.
-We will get the record from his neurologist tomorrow.
 
History of dementia:
-Continue memantine
 
History of GERD:
-Continue omeprazole
 
History of BPH:
-Continue Flomax
 
History of psychiatric illness:
-Continue Depakote
 
DVT prophylaxis:
Mechanical and subcutaneous Lovenox
 
CODE STATUS:
Full code
 
 
Problem List:
 1. Gait instability
 
 2. Fall
 
Pain Ratin
Pain Location:
none
Pain Goal: Remain pain free
Pain Plan:
pain pathway
Tomorrow's Labs & Rationales:
cbc/bep
 
 
Kristyn NAVARRO,Jenna 18 1005:
Attending MD Review Statement
 
Attending Statement
Attending MD Statement: examined this patient, discuss w/resident/PA/NP, agreed 
w/resident/PA/NP, reviewed EMR data (avail), discussed with nursing, reviewed 
images
Attending Assessment/Plan:
The Patient was seen by PT and they are recommending STR.  We need to call 
patient's neurologist first thing tomorrow morning to get more collateral 
history in terms of his parkinsonism, workup to date and medications for the 
same.  Right now we have him on tamsulosin, memantine and valproate as per his 
wife which are his outpatient medications.  There is no evidence of any 
infection or organic etiology accounting for his worsening mental status and 
weakness.

## 2018-05-07 VITALS — DIASTOLIC BLOOD PRESSURE: 68 MMHG | SYSTOLIC BLOOD PRESSURE: 100 MMHG

## 2018-05-07 VITALS — SYSTOLIC BLOOD PRESSURE: 136 MMHG | DIASTOLIC BLOOD PRESSURE: 70 MMHG

## 2018-05-07 NOTE — DISCHARGE SUMMARY
Visit Information
 
Visit Dates
Admission Date:
05/04/18
 
Discharge Date:
05/08/18
 
 
Hospital Course
 
Course
Attending Physician:
Fadumo Degroot MD
 
Primary Care Physician:
Mendez Myers MD
 
Hospital Course:
Patient is a 83-year-old gentleman with a past medical history significant for 
parkinsonism and dementia(on valproic acid, memantine and tamsulosin as an 
outpatient) presented to the ED for the evaluation of progressive weakness /
inability to walk and unwitnessed fall.
 
Pertinent vitals and labs on admission
Temperature afebrile pulse 70 his rate 18 blood pressure stable 139/92 on room 
air
 
Labs: No leukocytosis H&H low but stable normal BEP
Urine toxicology negative urinalysis is benign
 
IMAGING studies including CT cervical spine, CT head, chest x-ray, hip x-ray, 
pelvis x-ray, left hip CT scan, lumbar CT scan didn't reveal any acute pathology
/changes.
 
Following problems were addressed while patient was on [facility] floor
 
1.  Progressive physical deconditioning/inability to walk-with resultant 
unwitnessed fall:
 
Patient was admitted to general floor.  He received IV hydration for any 
orthostatic hypotension associated with parkinsonin's.  Troponins remained 
negative and EKG didn't show any STT changes.  Patient was evaluated by 
physical therapy recommended short-term rehabilitation on discharge.
 
2.  History of dementia
Valproic acid and  memantine was continued during the hospitalization.  His wife
reported that he never been diagnosed with Parkinson's.  Patient has depression 
and couple of years back he was given antidepressant medication that caused him 
extrapyramidal signs and having tremors in the hands that's what patient is 
using valproic acid.  Wife also reported the patient is following neurologist as
outpatient and he had recent CT scan of head.  Because the record from neurology
they never mention anything about Parkinson disease.  Patient was instructed to 
follow his neurologist as outpatient after the discharge.
 
DVT prophylaxis with subcutaneous Lovenox
Adequate pain control with Tylenol
Bowel regimen for constipation including senna as an MiraLAX
Patient is full code
Allergies:
Coded Allergies:
No Known Allergies (05/04/18)
 
Significant Procedures:
EXAM TYPE: CAT - CT CERV SPINE WO IV CONTRAST; CT HEAD WO IV CONTRAST
 
EXAMINATION:
CT HEAD WITHOUT CONTRAST
CT CERVICAL SPINE WITHOUT CONTRAST
 
CLINICAL INFORMATION:
Status post unwitnessed fall. Rule out ICH and fracture.
 
COMPARISON:
None
 
TECHNIQUE:
CT of the head and cervical spine were performed without intravenous
contrast. Multiplanar reformats were rendered and reviewed.
 
DLP:
959 mGy-cm.
 
FINDINGS:
CT head:
There is no intracranial hemorrhage, extra-axial collection, or calvarial
fracture. There is no mass, mass effect, or CT evidence of large territory
infarction. There is mild scattered hypoattenuation in the bilateral cerebral
white matter, which is nonspecific but likely reflects small vessel disease.
There is moderate diffuse brain parenchymal volume loss with prominence of
the ventricles and sulci.
 
The visualized paranasal sinuses are essentially clear. There are layering
secretions within the nasopharynx. The visualized mastoid air cells are
clear.
 
CT cervical spine:
The cervical vertebral body heights are maintained. The craniovertebral
junction is intact. There is trace retrolisthesis of C5 on C6.
 
There are multilevel degenerative changes with intervertebral disc height
loss at C4-C5 and C5-C6 in addition to multilevel endplate osteophytes,
uncovertebral hypertrophy, and facet arthropathy. There is no high-grade
osseous encroachment on the spinal canal. There is multilevel neural
foraminal stenosis which is most advanced at C4-C5 and C5-C6.
 
The lung apices are clear. No mass or fluid collection is seen within the
soft tissues. 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 There are atheromatous
changes at the carotid bifurcations more extensive on the left.
 
IMPRESSION:
CT head:
No acute intracranial abnormality. Mild small vessel ischemic changes and
moderate diffuse brain parenchymal volume loss.
 
CT cervical spine:
No cervical spine fracture or traumatic malalignment. Multilevel degenerative
spondylotic changes without high-grade osseous encroachment on the spinal
canal. Incidentally noted right-sided vocal fold paresis.
 
 
EXAM TYPE: RAD - XRY-CHEST XRAY, SINGLE VIEW
 
EXAMINATION:\H\
\N\XR CHEST
 
CLINICAL INFORMATION:
Fall.
 
COMPARISON:
None
 
TECHNIQUE:
Frontal view of the chest was obtained.
 
FINDINGS:
 
 
The lungs are well expanded. There is no focal consolidation, edema, or
effusion. No pneumothorax. The cardiomediastinal silhouette is within normal
limits of size with a calcified aorta. No acute osseous abnormality.
 
IMPRESSION:
No acute pulmonary findings. No displaced fractures are seen.
 
DICTATED BY: Harish NAVARRO,Hugh 
DATE/TIME DICTATED:05/04/18 / 1233
:[facility]BERS 
DATE/TIME TRANSCRIBED:05/04/18 / 1233
 
CONFIDENTIAL, DO NOT COPY WITHOUT APPROPRIATE AUTHORIZATION.
 
 <Electronically signed in Other Vendor System>                                 
          
                                           SIGNED BY: Hugh Moreira MD 05/04
/18 1237
 
 
EXAM TYPE: RAD - XRY-AP PELVIS; XRY-HIP 2-3 VIEWS, LEFT
 
EXAMINATION:
XR PELVIS
XR HIP, LEFT
 
CLINICAL INFORMATION:
Unwitnessed fall.
 
COMPARISON:
None
 
TECHNIQUE:
Frontal view of the pelvis with 2 additional views of the left hip.
 
FINDINGS:
There is no fracture or dislocation. The femoral heads are well-seated within
their acetabula. Mild degenerative changes are seen at the hips with small
marginal osteophytes present. The pelvic rim is intact. The sacroiliac joints
and pubic symphysis are intact. The bowel gas pattern is unremarkable.
Degenerative changes of the lower lumbar spine.
 
IMPRESSION:
No acute fracture or malalignment. Mild degenerative changes of the hips.
 
DICTATED BY: Hugh Moreira MD 
DATE/TIME DICTATED:05/04/18 / 1231
:RAD.[email or web address] 
DATE/TIME TRANSCRIBED:05/04/18 / 1231
 
CONFIDENTIAL, DO NOT COPY WITHOUT APPROPRIATE AUTHORIZATION.
 
 <Electronically signed in Other Vendor System>                                 
          
                                           SIGNED BY: Hugh Moreira MD 05/04
/18 1236
 
EXAM TYPE: CAT - CT LOWER EXT WO IV CONTRAST
 
EXAMINATION:
CT LOWER EXTREMITY WITHOUT CONTRAST, left hip
 
CLINICAL INFORMATION:
Unwitnessed fall. Cannot walk. Left hip pain.
 
COMPARISON:
None
 
TECHNIQUE:
Axial images obtained through the left hip. Coronal and sagittal reformatted
images are performed at the CT scanner
 
DLP:
661.90 mGy-cm
 
FINDINGS:
There is no fracture or dislocation of the left hip. No focal bone lesion. No
joint effusion. No soft tissue abnormality around the left hip.
 
Within the pelvis there is a large volume of stool in the visualized large
bowel loops. There are numerous diverticula of left colon and sigmoid without
inflammatory changes.
 
IMPRESSION:
No acute abnormality left hip.
 
DICTATED BY: Oseas Zimmerman MD 
DATE/TIME DICTATED:05/04/18 / 1735
:[email or web address] 
DATE/TIME TRANSCRIBED:05/04/18 / 1735
 
CONFIDENTIAL, DO NOT COPY WITHOUT APPROPRIATE AUTHORIZATION.
 
 <Electronically signed in Other Vendor System>                                 
          
                                           SIGNED BY: Oseas Zimmerman MD 05/04/18 
[id number]
 
EXAM TYPE: CAT - CT LUMB SPINE WO IV CONTRAST
 
EXAMINATION:
CT LUMBAR SPINE WITHOUT CONTRAST
 
CLINICAL INFORMATION:
Unwitnessed fall. Unable to ambulate. Evaluate for fracture.
 
COMPARISON:
No relevant prior imaging.
 
TECHNIQUE:
 images were obtained. CT acquisition of the lumbar spine was performed
without intravenous administration contrast. Data was reformatted into
multiplanar images at the acquisition workstation.
 
DLP:
425.65 mGy-cm
 
FINDINGS:
There is spinal scoliosis with a subtle rightward convex curvature. Slight
grade 1 anterolisthesis of L4 on L5 that appears to be related to advanced
facet degenerative changes at this level. Alignment is otherwise normal.
Vertebral heights are preserved. No acute fracture. There is slight loss of
intervertebral disc height with associated disc osteophyte spurring at the
levels of L2-L3, L3-L4, and L4-L5.
 
At T12-L1 and L1-L2 the annular contours are normal. No canal or
neuroforaminal compromise at these 2 levels.
 
At L2-L3 there is a diffusely bulging disc. Bilateral facet degenerative
change. No canal stenosis. No foraminal nerve root compression.
 
At L3-L4 there is a diffusely bulging disc. Bilateral facet degenerative
change. No canal stenosis. Mild to moderate mass effect on the foraminal
segment of left L3 nerve root.
 
At L4-L5 there is a diffuse annular bulge/pseudobulge causing ventral
flattening of the thecal sac. Advanced bilateral facet degenerative change.
No canal stenosis. Mild mass effect on the foraminal segments of both L4
nerve roots.
 
At L5-S1 there is a slightly bulging disc. Bilateral facet degenerative
change. No canal stenosis. Mild mass effect on the foraminal segments of both
L5 nerve roots.
 
Limited visualization of the retroperitoneal structures reveals numerous
diverticula within the sigmoid colon. There is atherosclerotic caliber
indication involve the abdominal aorta and iliac vessels. Psoas and
paraspinal muscle groups are symmetric.
 
IMPRESSION:
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
anterolisthesis of L4 on L5 that appears to be related to advanced facet
degenerative changes at this level. No acute fracture. No evidence of canal
compromise.
 
DICTATED BY: Giovana NAVARRO,Osiel JEFFRIES 
DATE/TIME DICTATED:05/04/18 / 1719
:RAD.CH[facility]BERS 
DATE/TIME TRANSCRIBED:05/04/18 / 1719
 
CONFIDENTIAL, DO NOT COPY WITHOUT APPROPRIATE AUTHORIZATION.
 
 
 
 
Disposition Summary
 
Disposition
Principal Diagnosis:
 Progressive physical deconditioning/inability to walk-with resultant 
unwitnessed fall
Additional Diagnosis:
History of parkinsonism and dementia
Discharge Disposition: SNF
 
Discharge Instructions
 
General Discharge Information
Code Status: Full Code
Patient's Diet:
Regular diet
Patient's Activity:
As tolerated
Follow-Up Instructions/Appts:
Please follow with you primary care physician within 1 week after discharge.
Please follow-up with the neurologist within 1 week after discharge
 
Medications at Discharge
Discharge Medications:
Continue taking these medications:
Omeprazole (Omeprazole) 40 MG CAPSULE.DR
    1 Capsule ORAL DAILY
    Qty = 90
    Comments:
       Last Taken: 5/8/18
             Time: 0540
 
Tamsulosin HCl (Tamsulosin HCl) 0.4 MG CAP.ER.24H
    1 Capsule ORAL DAILY
    Qty = 90
    Comments:
       Last Taken: 5/8/18
             Time: 0840
 
Memantine HCl (Namenda XR) 28 MG CAP.SPR.24
    1 Capsule ORAL DAILY
    Qty = 90
    Comments:
       Last Taken: 5/8/18
             Time: 0845
 
Divalproex Sodium (Divalproex Sodium) 250 MG TABLET.DR
    1 Tablet ORAL Every night
    Qty = 90
    Comments:
       LAST TAKEN: MAY 7, 18 @ 1956
 
 
Copies To:
Louis NAVARRO,Mendez RAINES
 
Attending MD Review Statement
Documenting Attending:
Fadumo Degroot MD
Other Findings:
Medically stable to be discharged today.

## 2018-05-07 NOTE — PN- ATT ADDEND
Attending Addendum
Attending Brief Note
Patient seen and examined.  Frail looking elderly male not in any obvious 
distress.  Confused and unable to engage in simple conversation.  Moves all 
extremities spontaneously but does not follow commands.  Per nursing staff 
report this is baseline for the patient.  Afebrile and hemodynamically stable.  
On examination he has no gross focal deficits although he is not fully 
cooperative.  Housestaff did speak with the patient's daughter earlier.  Patient
apparently carries a history of Parkinson's disease from his neurologist however
he is not on any medication.  He is to undergo home physical therapy.  Within 
the past few weeks he has been more unsteady.  He was brought to the ER for 
evaluation due to an unwitnessed fall.
 
 
Recommendations:
-Follow-up with patient's neurologist regarding diagnosis of Parkinson's disease
and therapy he is receiving for that.
-Check stool guaiac.  Check iron profile.  Repeat CBCs in a.m.
-Physical therapy to mobilize patient as tolerated.
-Recommend supportive care for his dementia.

## 2018-05-07 NOTE — PN- HOUSESTAFF
Subjective
Follow-up For:
Mechanical fall
Altered mental status
Subjective:
No overnight events.  Patient remained afebrile overnight.  He denied any chest 
pain, short of breath, nausea, vomiting and dysuria.  Patient having tremors and
hand considering his Parkinson disease.  He has difficulty in eating due to 
tremors so he needs finger foods.  Patient also having articulation problems 
that's his baseline.  We will talk to his wife to get information about Depakote
and contact number for them neurologist to get some records.  Patient needs 
placement.
 
Review of Systems
Constitutional:
Denies: chills, fever. 
EENTM:
Reports: no symptoms. 
Cardiovascular:
Denies: chest pain, palpitations. 
Respiratory:
Denies: cough, short of breath, sputum production. 
Gastrointestinal:
Denies: abdominal pain, diarrhea, melena, nausea. 
Genitourinary:
Reports: no symptoms. 
Musculoskeletal:
Reports: see HPI. 
Neurological/Psychological:
Reports: tremors. 
 
Objective
Last 24 Hrs of Vital Signs/I&O
 Vital Signs
 
 
Date Time Temp Pulse Resp B/P B/P Pulse O2 O2 Flow FiO2
 
     Mean Ox Delivery Rate 
 
 0844  62  136/70     
 
/ 0631 97.9 62 18 136/70  96 Room Air  
 
/ 0000       Room Air  
 
/ 2204 98.5 60 18 140/70  98 Room Air  
 
/ 1448 97.4 58 20 100/60  97   
 
 
 Intake & Output
 
 
  1600  0800 / 0000
 
Intake Total  180 480
 
Output Total   
 
Balance  180 480
 
    
 
Intake, Oral  180 480
 
Number  1 
 
Bowel   
 
Movements   
 
 
 
 
Physical Exam
General Appearance: Alert, Cooperative
Skin Temp/Moisture Exam: Warm/Dry
Sepsis Skin Exam (color): Normal for Ethnicity
HEENT: Atraumatic, EOMI
Neck: Supple
Cardiovascular: Normal S1, Normal S2
Lungs: Clear to Auscultation
Abdomen: Soft, No Tenderness
Neurological: Normal Speech, Normal Tone
Extremities: No Edema
 
Assessment/Plan
Assessment:
83-year-old male past medical history of Parkinson's and dementia who presents 
with an unwitnessed fall. 
 
We are following the patient for following problems:
 
Altered mental status and unwitnessed mechanical fall:
-Patient is more alert and cooperative.
-Wife also reported that patient has difficulty in articulation for long time 
that's his baseline.
-According to his wife possibly patient has mechanical fall and he was alert and
he has baseline dementia.
-Patient needs PT evaluation for gait instability
-Possible short-term rehabilitation.
-Patient is following neurology as outpatient for Parkinson disease and his wife
reported that his Parkinson disease is worsening according to his neurologist.  
Patient had recent CAT scan of the head for his Parkinson disease.
-Continue pain medications according to pain Path[facility].
-We will get the record from his neurologist.
 
History of dementia:
-Continue memantine
 
History of GERD:
-Continue omeprazole
 
History of BPH:
-Continue Flomax
 
History of psychiatric illness:
-Continue Depakote
 
DVT prophylaxis:
Mechanical and subcutaneous Lovenox
 
CODE STATUS:
Full code
Problem List:
 1. Gait instability
 
 2. Fall
 
Pain Ratin
Pain Location:
none
Pain Goal: Remain pain free
Pain Plan:
pain pathway
Tomorrow's Labs & Rationales:
none

## 2018-05-08 VITALS — SYSTOLIC BLOOD PRESSURE: 140 MMHG | DIASTOLIC BLOOD PRESSURE: 72 MMHG

## 2018-05-08 LAB
ABSOLUTE GRANULOCYTE CT: 5.4 /CUMM (ref 1.4–6.5)
BASOPHILS # BLD: 0 /CUMM (ref 0–0.2)
BASOPHILS NFR BLD: 0.3 % (ref 0–2)
EOSINOPHIL # BLD: 0 /CUMM (ref 0–0.7)
EOSINOPHIL NFR BLD: 0.2 % (ref 0–5)
ERYTHROCYTE [DISTWIDTH] IN BLOOD BY AUTOMATED COUNT: 14 % (ref 11.5–14.5)
GRANULOCYTES NFR BLD: 74.6 % (ref 42.2–75.2)
HCT VFR BLD CALC: 32.9 % (ref 42–52)
LYMPHOCYTES # BLD: 1.2 /CUMM (ref 1.2–3.4)
MCH RBC QN AUTO: 30.1 PG (ref 27–31)
MCHC RBC AUTO-ENTMCNC: 33.1 G/DL (ref 33–37)
MCV RBC AUTO: 91.1 FL (ref 80–94)
MONOCYTES # BLD: 0.6 /CUMM (ref 0.1–0.6)
PLATELET # BLD: 155 /CUMM (ref 130–400)
PMV BLD AUTO: 10.7 FL (ref 7.4–10.4)
RED BLOOD CELL CT: 3.61 /CUMM (ref 4.7–6.1)
WBC # BLD AUTO: 7.2 /CUMM (ref 4.8–10.8)

## 2018-05-08 NOTE — PATIENT DISCHARGE INSTRUCTIONS
Discharge Instructions
 
General Discharge Information
You were seen/treated for:
Mechanical fall
Generalized weakness
Watch for these problems:
Mechanical fall, weakness, decreased appetite, fever, chills, altered mental 
status and dizziness.
If you experience any of these symptoms please come to ED or call to pcp.
Special Instructions:
Follow up with your PCP in one week.
Follow up with your neurologist in one week.
 
Diet
Recommended Diet: Regular
 
Activity
Activity Self Limited: Yes
 
Acute Coronary Syndrome
 
Inclusion Criteria
At DC or during hospital stay patient has or had the following:
ACS DIAGNOSIS No
 
Discharge Core Measures
Meds if any: Prescribed or Continued at Discharge
Meds if any: NOT Prescribed or Continued at Discharge
 
Congestive Heart Failure
 
Inclusion Criteria
At DC or during hospital stay patient has or had the following:
CHF DIAGNOSIS No
 
Discharge Core Measures
Meds if any: Prescribed or Continued at Discharge
Meds if any: NOT Prescribed or Continued at Discharge
 
Cerebrovascular accident
 
Inclusion Criteria
At DC or during hospital stay patient has or had the following:
CVA/TIA Diagnosis No
 
Discharge Core Measures
Meds if any: Prescribed or Continued at Discharge
Meds if any: NOT Prescribed or Continued at Discharge
 
Venous thromboembolism
 
Inclusion Criteria
VTE Diagnosis No
VTE Type NONE
VTE Confirmed by (Test) NONE
 
Discharge Core Measures
- Per Current guidelines, there needs to be overlap
- treatment for the first 5 days of Warfarin therapy.
- If discharged on Warfarin prior to 5 days of
- overlap therapy, the patient will need to be
- assessed for post discharge needs including
- *Post discharge parental anticoagulation
- *Warfarin and/or parental anticoagulation education
- *Follow up date to check INR post discharge
At least 5 days overlap therapy as Inpatient No
Meds if any: Prescribed or Continued at Discharge
Note: Overlap Therapy is Warfarin and Anticoagulant
Meds if any: NOT Prescribed or Continued at Discharge

## 2018-05-08 NOTE — PN- HOUSESTAFF
[facility]:
Subjective
Follow-up For:
Mechanical fall
Altered mental status
Subjective:
No overnight events.  Patient remained afebrile.  Seen and examined this 
morning.  Denied any chest pain, short short of breath, nausea, abdominal pain 
and dysuria.  PT evaluated the patient and recommended short-term rehabilitation
due to his weakness and unsteady gait.
 
Review of Systems
Constitutional:
Denies: chills, fever. 
EENTM:
Reports: no symptoms. 
Cardiovascular:
Denies: chest pain, orthopena, palpitations. 
Respiratory:
Denies: cough, short of breath, sputum production. 
Gastrointestinal:
Reports: no symptoms. 
Genitourinary:
Reports: no symptoms. 
Neurological/Psychological:
Reports: no symptoms. 
 
Objective
Last 24 Hrs of Vital Signs/I&O
 Vital Signs
 
 
Date Time Temp Pulse Resp B/P B/P Pulse O2 O2 Flow FiO2
 
     Mean Ox Delivery Rate 
 
 0841    140/72     
 
 0616 98.3 67 20 140/72  99 Room Air  
 
 0000       Room Air  
 
 1407 97.0 57 20 100/68  95 Room Air  
 
 1122       Room Air  
 
 
 Intake & Output
 
 
  1600 08 0800  0000
 
Intake Total 200 240 700
 
Output Total   
 
Balance 200 240 700
 
    
 
Intake, Oral 200 240 700
 
Patient 117 lb  
 
Weight   
 
 
 
 
Physical Exam
General Appearance: Alert, Cooperative
Skin: No Rashes
Skin Temp/Moisture Exam: Warm/Dry
Sepsis Skin Exam (color): Normal for Ethnicity
HEENT: Atraumatic, EOMI
Cardiovascular: Normal S1, Normal S2
Lungs: Clear to Auscultation
Abdomen: Soft, No Tenderness
Neurological: Normal Speech, Normal Tone
Extremities: No Edema
 
Assessment/Plan
Assessment:
83-year-old male past medical history of Parkinson's and dementia who presents 
with an unwitnessed fall. 
 
We are following the patient for following problems:
 
Altered mental status and unwitnessed mechanical fall:
-Patient is more alert and cooperative.
-Wife also reported that patient has difficulty in articulation for long time 
that's his baseline.
-According to his wife possibly patient has mechanical fall and he was alert and
he has baseline dementia.
-PT recommended short-term rehabilitation due to his weakness and unsteady gait.
-We spoke to the wife again about Parkinson disease as patient is not on any 
medications.  She reported that patient never been diagnosed with Parkinson 
disease but he is falling neurology as outpatient.  We got the records from 
neurologist and there was nothing related to Parkinson disease.
-His wife also reported that patient had a reaction from anti-depressant 
medication and he developed extrapyramidal signs and and tremors that's why he 
is on valproic acid.
-Continue pain medications according to pain [facility].
 
History of dementia:
-Continue memantine
 
History of GERD:
-Continue omeprazole
 
History of BPH:
-Continue Flomax
 
History of tremors:
-Continue Depakote
 
DVT prophylaxis:
Mechanical and subcutaneous Lovenox
 
CODE STATUS:
Full code
Problem List:
 1. Gait instability
 
 2. Fall
 
Pain Ratin
Pain Location:
none
Pain Goal: Remain pain free
Pain Plan:
pain pathway
Tomorrow's Labs & Rationales:
none
 
 
Fadumo Degroot MD 18 1247:
Attending MD Review Statement
 
Attending Statement
Attending MD Statement: examined this patient, discuss w/resident/PA/NP, agreed 
w/resident/PA/NP, reviewed EMR data (avail), discussed with nursing, discussed 
with case mgmt, amended to note
Attending Assessment/Plan:
Patient seen and examined.  Resting comfortably not in any acute distress.  No 
issues overnight reported by nursing staff.  He is alert this morning and offers
no new complaints.  Laboratory data shows hemoglobin level to be stable.  No 
rectal bleeding reported by nursing staff.  Due to his market deconditioning 
physical therapy service is recommending short-term rehabilitation.  He will be 
discharged to a skilled nursing facility today where he will be undergoing 
physical therapy.  He will hopefully improve to the point where he can be safely
discharged back home.  Patient's wife is in agreement with this plan.